# Patient Record
Sex: MALE | Race: BLACK OR AFRICAN AMERICAN | NOT HISPANIC OR LATINO | URBAN - METROPOLITAN AREA
[De-identification: names, ages, dates, MRNs, and addresses within clinical notes are randomized per-mention and may not be internally consistent; named-entity substitution may affect disease eponyms.]

---

## 2024-11-08 ENCOUNTER — EMERGENCY (EMERGENCY)
Facility: HOSPITAL | Age: 59
LOS: 0 days | Discharge: LEFT BEFORE DISCHARGE | End: 2024-11-08
Attending: EMERGENCY MEDICINE
Payer: MEDICAID

## 2024-11-08 VITALS
HEART RATE: 94 BPM | DIASTOLIC BLOOD PRESSURE: 84 MMHG | HEIGHT: 73 IN | TEMPERATURE: 99 F | RESPIRATION RATE: 18 BRPM | WEIGHT: 220.02 LBS | SYSTOLIC BLOOD PRESSURE: 134 MMHG | OXYGEN SATURATION: 97 %

## 2024-11-08 PROCEDURE — 82962 GLUCOSE BLOOD TEST: CPT

## 2024-11-08 PROCEDURE — 99282 EMERGENCY DEPT VISIT SF MDM: CPT

## 2024-11-08 PROCEDURE — 99283 EMERGENCY DEPT VISIT LOW MDM: CPT | Mod: 1L

## 2024-11-08 NOTE — ED PROVIDER NOTE - OBJECTIVE STATEMENT
Pt is a 58 male with PMH noted in chart presents to ED with complaints of previous headache and lightheadedness. Resides at group home, went to medical clinic on campus advised them of symptoms and sent him to Ed for further work up. Pt states during transport felt fine and did not want to go hospital. Pt states he no longer has headache or dizziness and would like to go home. Pt denies any fever, chills, bodyaches, chest pain, sob, abdominal pain, NVCD

## 2024-11-08 NOTE — ED PROVIDER NOTE - CLINICAL SUMMARY MEDICAL DECISION MAKING FREE TEXT BOX
58-year-old man with history of DM, substance use, sent to ER from [facility] (short-term rehab facility for addiction) sent to ER for evaluation of headache and dizziness.  Patient states earlier today he was feeling lightheaded, felt like he might pass out but did not.  Mild dull HA.  No visual changes.  No CP/SOB.  No motor weakness or paresthesias.  No abdominal pain.  No N/V/D.  No F/C.  Patient states during transport his symptoms resolved, has no complaints at this time.  Patient refusing any workup or evaluation in ER.  Wants to return to living facility.  Denies any recent drug or alcohol use.  No SI/HI, no complaints of depressive symptoms.  PE - nad, nc/at, eomi, perrl, op - clear, mmm, neck supple, cta b/l, no w/r/r, rrr, abd- soft, nt/nd, nabs, from x 4, no LE swelling/tenderness, A&O x 3, cn 2-12 intact, no focal motor/sensory deficits.     - in ER.  Patient refusing any additional workup or evaluation.  Patient told that his workup is incomplete and that leaving could result in death or current disability, patient understands, but still wants to go.  To sign out AMA.  Patient told he return to ER anytime to continue his evaluation.  Encouraged follow-up with PMD as outpatient.  Patient understands and agrees with plan.

## 2024-11-08 NOTE — ED PROVIDER NOTE - PATIENT PORTAL LINK FT
You can access the FollowMyHealth Patient Portal offered by [facility] by registering at the following website: [email or web address]. By joining [facility]’s FollowMyHealth portal, you will also be able to view your health information using other applications (apps) compatible with our system.

## 2024-11-08 NOTE — ED ADULT NURSE NOTE - NS ED PATIENT SAFETY CONCERN
No [No] : No [] : Yes [6-10] : 6-10 [No falls in past year] : Patient reported no falls in the past year [0] : 2) Feeling down, depressed, or hopeless: Not at all (0) [de-identified] : age 15 [[id number]] : 0